# Patient Record
Sex: MALE | Race: WHITE | NOT HISPANIC OR LATINO | URBAN - METROPOLITAN AREA
[De-identification: names, ages, dates, MRNs, and addresses within clinical notes are randomized per-mention and may not be internally consistent; named-entity substitution may affect disease eponyms.]

---

## 2022-11-29 PROBLEM — Z00.00 ENCOUNTER FOR PREVENTIVE HEALTH EXAMINATION: Status: ACTIVE | Noted: 2022-11-29

## 2024-07-16 ENCOUNTER — INPATIENT (INPATIENT)
Facility: HOSPITAL | Age: 74
LOS: 2 days | Discharge: NURSING, REHAB OR LONG-TERM CARE | End: 2024-07-19
Attending: INTERNAL MEDICINE | Admitting: INTERNAL MEDICINE
Payer: MEDICARE

## 2024-07-16 ENCOUNTER — APPOINTMENT (OUTPATIENT)
Dept: ORTHOPEDIC SURGERY | Facility: CLINIC | Age: 74
End: 2024-07-16

## 2024-07-16 VITALS
WEIGHT: 164.91 LBS | DIASTOLIC BLOOD PRESSURE: 68 MMHG | HEART RATE: 76 BPM | TEMPERATURE: 98 F | RESPIRATION RATE: 16 BRPM | SYSTOLIC BLOOD PRESSURE: 102 MMHG | HEIGHT: 72 IN | OXYGEN SATURATION: 100 %

## 2024-07-16 DIAGNOSIS — Z87.898 PERSONAL HISTORY OF OTHER SPECIFIED CONDITIONS: ICD-10-CM

## 2024-07-16 DIAGNOSIS — N40.0 BENIGN PROSTATIC HYPERPLASIA WITHOUT LOWER URINARY TRACT SYMPTOMS: Chronic | ICD-10-CM

## 2024-07-16 DIAGNOSIS — Z86.59 PERSONAL HISTORY OF OTHER MENTAL AND BEHAVIORAL DISORDERS: ICD-10-CM

## 2024-07-16 DIAGNOSIS — D72.829 ELEVATED WHITE BLOOD CELL COUNT, UNSPECIFIED: ICD-10-CM

## 2024-07-16 DIAGNOSIS — R53.81 OTHER MALAISE: ICD-10-CM

## 2024-07-16 DIAGNOSIS — S72.009A FRACTURE OF UNSPECIFIED PART OF NECK OF UNSPECIFIED FEMUR, INITIAL ENCOUNTER FOR CLOSED FRACTURE: ICD-10-CM

## 2024-07-16 DIAGNOSIS — I10 ESSENTIAL (PRIMARY) HYPERTENSION: ICD-10-CM

## 2024-07-16 DIAGNOSIS — F03.90 UNSPECIFIED DEMENTIA, UNSPECIFIED SEVERITY, WITHOUT BEHAVIORAL DISTURBANCE, PSYCHOTIC DISTURBANCE, MOOD DISTURBANCE, AND ANXIETY: ICD-10-CM

## 2024-07-16 DIAGNOSIS — Z96.642 PRESENCE OF LEFT ARTIFICIAL HIP JOINT: Chronic | ICD-10-CM

## 2024-07-16 DIAGNOSIS — E11.9 TYPE 2 DIABETES MELLITUS W/OUT COMPLICATIONS: ICD-10-CM

## 2024-07-16 DIAGNOSIS — E11.9 TYPE 2 DIABETES MELLITUS WITHOUT COMPLICATIONS: ICD-10-CM

## 2024-07-16 DIAGNOSIS — F41.9 ANXIETY DISORDER, UNSPECIFIED: ICD-10-CM

## 2024-07-16 DIAGNOSIS — E78.5 HYPERLIPIDEMIA, UNSPECIFIED: ICD-10-CM

## 2024-07-16 DIAGNOSIS — Z86.39 PERSONAL HISTORY OF OTHER ENDOCRINE, NUTRITIONAL AND METABOLIC DISEASE: ICD-10-CM

## 2024-07-16 DIAGNOSIS — N40.0 BENIGN PROSTATIC HYPERPLASIA WITHOUT LOWER URINARY TRACT SYMPTOMS: ICD-10-CM

## 2024-07-16 DIAGNOSIS — F32.9 MAJOR DEPRESSIVE DISORDER, SINGLE EPISODE, UNSPECIFIED: ICD-10-CM

## 2024-07-16 DIAGNOSIS — D64.9 ANEMIA, UNSPECIFIED: ICD-10-CM

## 2024-07-16 LAB
ALBUMIN SERPL ELPH-MCNC: 2.8 G/DL — LOW (ref 3.3–5)
ALP SERPL-CCNC: 153 U/L — HIGH (ref 40–120)
ALT FLD-CCNC: 32 U/L — SIGNIFICANT CHANGE UP (ref 12–78)
ANION GAP SERPL CALC-SCNC: 5 MMOL/L — SIGNIFICANT CHANGE UP (ref 5–17)
AST SERPL-CCNC: 15 U/L — SIGNIFICANT CHANGE UP (ref 15–37)
BASOPHILS # BLD AUTO: 0.04 K/UL — SIGNIFICANT CHANGE UP (ref 0–0.2)
BASOPHILS NFR BLD AUTO: 0.3 % — SIGNIFICANT CHANGE UP (ref 0–2)
BILIRUB SERPL-MCNC: 0.7 MG/DL — SIGNIFICANT CHANGE UP (ref 0.2–1.2)
BUN SERPL-MCNC: 26 MG/DL — HIGH (ref 7–23)
CALCIUM SERPL-MCNC: 11.4 MG/DL — HIGH (ref 8.5–10.1)
CHLORIDE SERPL-SCNC: 102 MMOL/L — SIGNIFICANT CHANGE UP (ref 96–108)
CO2 SERPL-SCNC: 31 MMOL/L — SIGNIFICANT CHANGE UP (ref 22–31)
CREAT SERPL-MCNC: 0.85 MG/DL — SIGNIFICANT CHANGE UP (ref 0.5–1.3)
EGFR: 91 ML/MIN/1.73M2 — SIGNIFICANT CHANGE UP
EOSINOPHIL # BLD AUTO: 0.13 K/UL — SIGNIFICANT CHANGE UP (ref 0–0.5)
EOSINOPHIL NFR BLD AUTO: 1.1 % — SIGNIFICANT CHANGE UP (ref 0–6)
GLUCOSE BLDC GLUCOMTR-MCNC: 161 MG/DL — HIGH (ref 70–99)
GLUCOSE SERPL-MCNC: 103 MG/DL — HIGH (ref 70–99)
HCT VFR BLD CALC: 29.5 % — LOW (ref 39–50)
HGB BLD-MCNC: 9.5 G/DL — LOW (ref 13–17)
IMM GRANULOCYTES NFR BLD AUTO: 0.9 % — SIGNIFICANT CHANGE UP (ref 0–0.9)
LYMPHOCYTES # BLD AUTO: 1.64 K/UL — SIGNIFICANT CHANGE UP (ref 1–3.3)
LYMPHOCYTES # BLD AUTO: 13.4 % — SIGNIFICANT CHANGE UP (ref 13–44)
MCHC RBC-ENTMCNC: 28.3 PG — SIGNIFICANT CHANGE UP (ref 27–34)
MCHC RBC-ENTMCNC: 32.2 G/DL — SIGNIFICANT CHANGE UP (ref 32–36)
MCV RBC AUTO: 87.8 FL — SIGNIFICANT CHANGE UP (ref 80–100)
MONOCYTES # BLD AUTO: 0.63 K/UL — SIGNIFICANT CHANGE UP (ref 0–0.9)
MONOCYTES NFR BLD AUTO: 5.1 % — SIGNIFICANT CHANGE UP (ref 2–14)
NEUTROPHILS # BLD AUTO: 9.73 K/UL — HIGH (ref 1.8–7.4)
NEUTROPHILS NFR BLD AUTO: 79.2 % — HIGH (ref 43–77)
NRBC # BLD: 0 /100 WBCS — SIGNIFICANT CHANGE UP (ref 0–0)
PLATELET # BLD AUTO: 342 K/UL — SIGNIFICANT CHANGE UP (ref 150–400)
POTASSIUM SERPL-MCNC: 3.9 MMOL/L — SIGNIFICANT CHANGE UP (ref 3.5–5.3)
POTASSIUM SERPL-SCNC: 3.9 MMOL/L — SIGNIFICANT CHANGE UP (ref 3.5–5.3)
PROT SERPL-MCNC: 6.3 GM/DL — SIGNIFICANT CHANGE UP (ref 6–8.3)
RBC # BLD: 3.36 M/UL — LOW (ref 4.2–5.8)
RBC # FLD: 14.6 % — HIGH (ref 10.3–14.5)
SODIUM SERPL-SCNC: 138 MMOL/L — SIGNIFICANT CHANGE UP (ref 135–145)
WBC # BLD: 12.28 K/UL — HIGH (ref 3.8–10.5)
WBC # FLD AUTO: 12.28 K/UL — HIGH (ref 3.8–10.5)

## 2024-07-16 PROCEDURE — 99204 OFFICE O/P NEW MOD 45 MIN: CPT

## 2024-07-16 PROCEDURE — 73502 X-RAY EXAM HIP UNI 2-3 VIEWS: CPT | Mod: 26,LT

## 2024-07-16 PROCEDURE — 99222 1ST HOSP IP/OBS MODERATE 55: CPT

## 2024-07-16 PROCEDURE — 99285 EMERGENCY DEPT VISIT HI MDM: CPT

## 2024-07-16 PROCEDURE — 93010 ELECTROCARDIOGRAM REPORT: CPT

## 2024-07-16 PROCEDURE — 73503 X-RAY EXAM HIP UNI 4/> VIEWS: CPT | Mod: LT

## 2024-07-16 PROCEDURE — 73552 X-RAY EXAM OF FEMUR 2/>: CPT | Mod: 26,LT

## 2024-07-16 RX ORDER — SITAGLIPTIN AND METFORMIN HYDROCHLORIDE 50; 1000 MG/1; MG/1
50-1000 TABLET, FILM COATED ORAL
Refills: 0 | Status: ACTIVE | COMMUNITY

## 2024-07-16 RX ORDER — EMPAGLIFLOZIN 10 MG/1
10 TABLET, FILM COATED ORAL
Refills: 0 | Status: ACTIVE | COMMUNITY

## 2024-07-16 RX ORDER — INSULIN LISPRO 100 [IU]/ML
INJECTION, SOLUTION SUBCUTANEOUS
Refills: 0 | Status: DISCONTINUED | OUTPATIENT
Start: 2024-07-16 | End: 2024-07-19

## 2024-07-16 RX ORDER — ACETAMINOPHEN 325 MG
650 TABLET ORAL EVERY 6 HOURS
Refills: 0 | Status: DISCONTINUED | OUTPATIENT
Start: 2024-07-16 | End: 2024-07-19

## 2024-07-16 RX ORDER — FINASTERIDE 5 MG/1
5 TABLET, FILM COATED ORAL
Refills: 0 | Status: ACTIVE | COMMUNITY

## 2024-07-16 RX ORDER — GLIPIZIDE 5 MG
1 TABLET ORAL
Refills: 0 | DISCHARGE

## 2024-07-16 RX ORDER — GLUCAGON HYDROCHLORIDE 1 MG/ML
1 INJECTION, POWDER, FOR SOLUTION INTRAMUSCULAR; INTRAVENOUS; SUBCUTANEOUS ONCE
Refills: 0 | Status: DISCONTINUED | OUTPATIENT
Start: 2024-07-16 | End: 2024-07-19

## 2024-07-16 RX ORDER — GLIPIZIDE 10 MG/1
10 TABLET ORAL
Refills: 0 | Status: ACTIVE | COMMUNITY

## 2024-07-16 RX ORDER — TAMSULOSIN HYDROCHLORIDE 0.4 MG/1
0.8 CAPSULE ORAL AT BEDTIME
Refills: 0 | Status: DISCONTINUED | OUTPATIENT
Start: 2024-07-16 | End: 2024-07-19

## 2024-07-16 RX ORDER — DEXTROSE 30 % IN WATER 30 %
12.5 VIAL (ML) INTRAVENOUS ONCE
Refills: 0 | Status: DISCONTINUED | OUTPATIENT
Start: 2024-07-16 | End: 2024-07-19

## 2024-07-16 RX ORDER — DEXTROSE 30 % IN WATER 30 %
15 VIAL (ML) INTRAVENOUS ONCE
Refills: 0 | Status: DISCONTINUED | OUTPATIENT
Start: 2024-07-16 | End: 2024-07-19

## 2024-07-16 RX ORDER — DEXTROSE 30 % IN WATER 30 %
25 VIAL (ML) INTRAVENOUS ONCE
Refills: 0 | Status: DISCONTINUED | OUTPATIENT
Start: 2024-07-16 | End: 2024-07-19

## 2024-07-16 RX ORDER — ASPIRIN 325 MG/1
81 TABLET, FILM COATED ORAL
Refills: 0 | Status: DISCONTINUED | OUTPATIENT
Start: 2024-07-16 | End: 2024-07-19

## 2024-07-16 RX ORDER — EMPAGLIFLOZIN 25 MG/1
1 TABLET, FILM COATED ORAL
Refills: 0 | DISCHARGE

## 2024-07-16 RX ORDER — CLONAZEPAM 2 MG/1
0.5 TABLET ORAL
Refills: 0 | Status: DISCONTINUED | OUTPATIENT
Start: 2024-07-16 | End: 2024-07-19

## 2024-07-16 RX ORDER — ATORVASTATIN CALCIUM 20 MG/1
1 TABLET, FILM COATED ORAL
Refills: 0 | DISCHARGE

## 2024-07-16 RX ORDER — ATORVASTATIN CALCIUM 40 MG/1
40 TABLET, FILM COATED ORAL
Refills: 0 | Status: ACTIVE | COMMUNITY

## 2024-07-16 RX ORDER — CLONAZEPAM 1 MG/1
1 TABLET ORAL
Refills: 0 | Status: ACTIVE | COMMUNITY

## 2024-07-16 RX ORDER — LOSARTAN POTASSIUM 100 MG/1
100 TABLET, FILM COATED ORAL
Refills: 0 | Status: ACTIVE | COMMUNITY

## 2024-07-16 RX ORDER — CLONAZEPAM 2 MG/1
1 TABLET ORAL
Refills: 0 | DISCHARGE

## 2024-07-16 RX ORDER — DONEPEZIL HYDROCHLORIDE 10 MG/1
10 TABLET, FILM COATED ORAL DAILY
Refills: 0 | Status: DISCONTINUED | OUTPATIENT
Start: 2024-07-17 | End: 2024-07-19

## 2024-07-16 RX ORDER — LOSARTAN POTASSIUM 100 MG/1
100 TABLET, FILM COATED ORAL DAILY
Refills: 0 | Status: DISCONTINUED | OUTPATIENT
Start: 2024-07-16 | End: 2024-07-19

## 2024-07-16 RX ORDER — DEXTROSE MONOHYDRATE AND SODIUM CHLORIDE 5; .3 G/100ML; G/100ML
1000 INJECTION, SOLUTION INTRAVENOUS
Refills: 0 | Status: DISCONTINUED | OUTPATIENT
Start: 2024-07-16 | End: 2024-07-19

## 2024-07-16 RX ORDER — ONDANSETRON HYDROCHLORIDE 2 MG/ML
4 INJECTION INTRAMUSCULAR; INTRAVENOUS EVERY 8 HOURS
Refills: 0 | Status: DISCONTINUED | OUTPATIENT
Start: 2024-07-16 | End: 2024-07-19

## 2024-07-16 RX ORDER — PAROXETINE HYDROCHLORIDE 37.5 MG/1
2 TABLET, FILM COATED, EXTENDED RELEASE ORAL
Refills: 0 | DISCHARGE

## 2024-07-16 RX ORDER — PAROXETINE HYDROCHLORIDE 37.5 MG/1
20 TABLET, FILM COATED, EXTENDED RELEASE ORAL
Refills: 0 | Status: DISCONTINUED | OUTPATIENT
Start: 2024-07-16 | End: 2024-07-19

## 2024-07-16 RX ORDER — DONEPEZIL HYDROCHLORIDE 10 MG/1
1 TABLET, FILM COATED ORAL
Refills: 0 | DISCHARGE

## 2024-07-16 RX ORDER — QUETIAPINE FUMARATE 25 MG/1
25 TABLET ORAL
Refills: 0 | Status: ACTIVE | COMMUNITY

## 2024-07-16 RX ORDER — MAGNESIUM, ALUMINUM HYDROXIDE 400-400
30 TABLET,CHEWABLE ORAL EVERY 4 HOURS
Refills: 0 | Status: DISCONTINUED | OUTPATIENT
Start: 2024-07-16 | End: 2024-07-19

## 2024-07-16 RX ORDER — PAROXETINE HYDROCHLORIDE 10 MG/1
10 TABLET, FILM COATED ORAL
Refills: 0 | Status: ACTIVE | COMMUNITY

## 2024-07-16 RX ORDER — DONEPEZIL HYDROCHLORIDE 10 MG/1
10 TABLET ORAL
Refills: 0 | Status: ACTIVE | COMMUNITY

## 2024-07-16 RX ORDER — ALFUZOSIN HYDROCHLORIDE 10 MG/1
1 TABLET, EXTENDED RELEASE ORAL
Refills: 0 | DISCHARGE

## 2024-07-16 RX ORDER — SITAGLIPTIN AND METFORMIN HYDROCHLORIDE 50; 500 MG/1; MG/1
2 TABLET, FILM COATED ORAL
Refills: 0 | DISCHARGE

## 2024-07-16 RX ORDER — ATORVASTATIN CALCIUM 20 MG/1
40 TABLET, FILM COATED ORAL AT BEDTIME
Refills: 0 | Status: DISCONTINUED | OUTPATIENT
Start: 2024-07-16 | End: 2024-07-19

## 2024-07-16 RX ADMIN — Medication 50 MILLIGRAM(S): at 21:38

## 2024-07-16 RX ADMIN — ASPIRIN 81 MILLIGRAM(S): 325 TABLET, FILM COATED ORAL at 19:29

## 2024-07-16 RX ADMIN — TAMSULOSIN HYDROCHLORIDE 0.8 MILLIGRAM(S): 0.4 CAPSULE ORAL at 21:38

## 2024-07-16 RX ADMIN — ATORVASTATIN CALCIUM 40 MILLIGRAM(S): 20 TABLET, FILM COATED ORAL at 21:38

## 2024-07-16 RX ADMIN — INSULIN LISPRO 1: 100 INJECTION, SOLUTION SUBCUTANEOUS at 21:38

## 2024-07-16 NOTE — ED ADULT NURSE NOTE - OBJECTIVE STATEMENT
Pt alert and awake, ambulating with assistive device. Wife at bedside reports they were in [place] when pt fell on marble floor after showering on 7/1/24. Wife reports pt broke L hip and had it replaced in [place]. Wife reports they came back on flight yesterday and went to see their doctor where Delaney catheter was removed and was recommended to come to hospital for rehab admission. Wife concerned as they live in [facility] with bedroom and full bathroom on 2nd floor. Pt denies SOB, fever/chills, N/V/D, headache/dizziness, abdominal pain, chest pain, or dysuria.

## 2024-07-16 NOTE — CONSULT NOTE ADULT - SUBJECTIVE AND OBJECTIVE BOX
73M s/p 2 weeks Left hip Hemiarthroplasty in [place] while on vacation presenting for rehab placement 2/2 inability to navigate safely at home. Patient saw Dr. West in the office today after being home from [place] and was sent to the ED. Patient was given blood thinners and a pedraza to travel from [place] to [place].   Patient has no other complaints at this time  Denies HA/N/V/D/CP/SOB    Vital Signs Last 24 Hrs  T(C): 36.8 (16 Jul 2024 17:22), Max: 36.8 (16 Jul 2024 17:22)  T(F): 98.2 (16 Jul 2024 17:22), Max: 98.2 (16 Jul 2024 17:22)  HR: 80 (16 Jul 2024 17:22) (76 - 80)  BP: 123/65 (16 Jul 2024 17:22) (102/68 - 123/65)  BP(mean): --  RR: 17 (16 Jul 2024 17:22) (16 - 17)  SpO2: 99% (16 Jul 2024 17:22) (99% - 100%)    Parameters below as of 16 Jul 2024 17:22  Patient On (Oxygen Delivery Method): room air                          9.5    12.28 )-----------( 342      ( 16 Jul 2024 16:10 )             29.5     07-16    138  |  102  |  26<H>  ----------------------------<  103<H>  3.9   |  31  |  0.85    Ca    11.4<H>      16 Jul 2024 16:10    TPro  6.3  /  Alb  2.8<L>  /  TBili  0.7  /  DBili  x   /  AST  15  /  ALT  32  /  AlkPhos  153<H>  07-16          Urinalysis Basic - ( 16 Jul 2024 16:10 )    Color: x / Appearance: x / SG: x / pH: x  Gluc: 103 mg/dL / Ketone: x  / Bili: x / Urobili: x   Blood: x / Protein: x / Nitrite: x   Leuk Esterase: x / RBC: x / WBC x   Sq Epi: x / Non Sq Epi: x / Bacteria: x            CAPILLARY BLOOD GLUCOSE                Physical Exam  Gen: NAD  LLE: Incision healing well. Staples removed in office. Full ROM to hip/knee/ankle. +ehl/fhl/ta/gs function, SILT L3-S1, no calf ttp, dp/pt pulse intact, compartments soft  Secondary survey: benign, nv intact, able to SLR contralateral leg, negative log roll contralateral leg, no bony ttp elsewhere

## 2024-07-16 NOTE — H&P ADULT - NSICDXPASTSURGICALHX_GEN_ALL_CORE_FT
PAST SURGICAL HISTORY:  BPH (benign prostatic hyperplasia)     History of left hip hemiarthroplasty

## 2024-07-16 NOTE — ED ADULT NURSE NOTE - CHIEF COMPLAINT QUOTE
Sent by [facility] for pre rehab screening. patient had a fractured left hip  with surgery after slipping and fell in bathroom in [place] on 7/1/24.  hx htn, dm, depression ,dementia, BPH, anxiety.

## 2024-07-16 NOTE — CONSULT NOTE ADULT - ASSESSMENT
73M 2 weeks post op L hemiarthroplasty presenting for [facility] placement  PT consult   WBAT  DVT ppx: Aspirin 81 BID  Pain control  Medical management as per primary team  Discussed with Dr. West

## 2024-07-16 NOTE — H&P ADULT - ASSESSMENT
Panchito Win is a 73 year old male with PMHx of HTN, HLD, NIDDM2, BPH, anxiety, depression, and dementia and recent PSHx L. hemiarthroplasty (on 7/3/24) who presented to the ED on 7/16/24 for complaints of unsteady gait and admitted for physical deconditioning secondary to recent L. hip fracture.    Physical deconditioning secondary to recent L. hip fracture  Sustained mechanical fall while walking out of the shower on 7/1/24, found to have L. hip fracture  Underwent L. hip hemiarthroplasty on 7/3/24, participated with PT inpatient, still with unsteady gait   Baseline functional status is ambulates unassisted  L. hip and L. femur x-ray with L. hip replacement and moderate L. knee degeneration  DVT prophylaxis with ASA 81 mg BID started  PT/OT WBAT consulted  Orthopedic surgery recommendations appreciated    Leukocytosis, likely reactive  Afebrile, WBC 12.28K on admission  No signs of infection  Monitor WBC trend    Normocytic anemia  Hgb 9.5 on admission, unknown baseline hgb  No signs of active bleeding  F/u anemia panel, monitor hgb      Chronic medical conditions:  HTN: PTA losartan / HCTZ 100 / 12.5 mg,   HLD: PTA atorvastatin 40 mg  NIDDM2: POC qac and qhs, PTA glipizide ER 10 mg, Janumet XR 50 / 1000 mg BID, and jardiance 10 mg held, low dose SSI qac started, blood glucose goal < 180, f/u A1c  BPH: PTA alfuzosin 10 mg reordered as tamsulosin 0.8 mg given alfuzosin not on formulary, finasteride 5 mg  Anxiety: PTA paroxetine 20 mg BID, clonazepam 0.5 mg BID  Depression: PTA quetiapine 50 mg qhs  Dementia: PTA donepezil 10 mg    Medication reconciliation completed using med list provided by wife at bedside.    Plan of care discussed with wife at bedside. Panchito Win is a 73 year old male with PMHx of HTN, HLD, NIDDM2, BPH, anxiety, depression, and dementia and recent PSHx L. hemiarthroplasty (on 7/3/24) who presented to the ED on 7/16/24 for complaints of unsteady gait and admitted for physical deconditioning secondary to recent L. hip fracture.    Physical deconditioning secondary to recent L. hip fracture  Sustained mechanical fall while walking out of the shower on 7/1/24, found to have L. hip fracture  Underwent L. hip hemiarthroplasty on 7/3/24, participated with PT inpatient, still with unsteady gait   Baseline functional status is ambulates unassisted  L. hip and L. femur x-ray with L. hip replacement and moderate L. knee degeneration  DVT prophylaxis with ASA 81 mg BID started  PT/OT WBAT consulted  Orthopedic surgery recommendations appreciated    Leukocytosis, likely reactive  Afebrile, WBC 12.28K on admission  No signs of infection  Monitor WBC trend    Normocytic anemia  Hgb 9.5 on admission, unknown baseline hgb  No signs of active bleeding  F/u anemia panel, monitor hgb      Chronic medical conditions:  HTN: PTA losartan / HCTZ 100 / 12.5 mg  HLD: PTA atorvastatin 40 mg  NIDDM2: POC qac and qhs, PTA glipizide ER 10 mg, Janumet XR 50 / 1000 mg BID, and jardiance 10 mg held, low dose SSI qac started, blood glucose goal < 180, f/u A1c  BPH: PTA alfuzosin 10 mg reordered as tamsulosin 0.8 mg given alfuzosin not on formulary, finasteride 5 mg  Anxiety: PTA paroxetine 20 mg BID, clonazepam 0.5 mg BID  Depression: PTA quetiapine 50 mg qhs  Dementia: PTA donepezil 10 mg    Medication reconciliation completed using med list provided by wife at bedside.    Plan of care discussed with wife at bedside.

## 2024-07-16 NOTE — ED ADULT TRIAGE NOTE - CHIEF COMPLAINT QUOTE
Sent by [facility] for pre rehab screening. patient had a fractured left hip  with surgery after slipping and fell in bathroom in [place] on 7/1/24.  hx htn, dm, depression ,dementia, BPH, anxiety.

## 2024-07-16 NOTE — H&P ADULT - NSICDXPASTMEDICALHX_GEN_ALL_CORE_FT
PAST MEDICAL HISTORY:  Anxiety and depression     Dementia     HLD (hyperlipidemia)     HTN (hypertension)     Non-insulin dependent type 2 diabetes mellitus      PAST MEDICAL HISTORY:  Anxiety and depression     BPH (benign prostatic hyperplasia)     Dementia     HLD (hyperlipidemia)     HTN (hypertension)     Non-insulin dependent type 2 diabetes mellitus

## 2024-07-16 NOTE — ED ADULT NURSE NOTE - NSFALLHARMRISKINTERV_ED_ALL_ED

## 2024-07-16 NOTE — H&P ADULT - NSHPPHYSICALEXAM_GEN_ALL_CORE
T(C): 36.7 (07-16-24 @ 21:23), Max: 36.8 (07-16-24 @ 17:22)  HR: 76 (07-16-24 @ 21:23) (76 - 83)  BP: 123/68 (07-16-24 @ 21:23) (102/68 - 123/68)  RR: 16 (07-16-24 @ 21:23) (16 - 17)  SpO2: 100% (07-16-24 @ 21:23) (99% - 100%)    CONSTITUTIONAL: Well groomed, no apparent distress  EYES: PERRLA and symmetric, EOMI  ENMT: Oral mucosa with moist membranes  RESP: No respiratory distress, no use of accessory muscles; CTA b/l  CV: RRR  GI: Soft, NT, ND  MSK: L. hip incision appears C/D/I, no surrounding erythema or ecchymosis appreciated

## 2024-07-16 NOTE — H&P ADULT - TIME BILLING
coordination of care with ER physician and ER RN, obtaining history, performing a physical examination, reviewing and interpreting labs and imaging, ordering further studies and tests, explaining the diagnosis and treatment plan to patient and wife at bedside, completing medication reconciliation, and documentation as above.

## 2024-07-16 NOTE — ED PROVIDER NOTE - OBJECTIVE STATEMENT
72 y/o M hx of BPH, DM, HTN, dementia sent in by dr. justin (orthopedic) for rehab. patient w/ fractured hip that was repaired in [place] 2 weeks ago. wife at bedside who states he has been unsteady. denies chest pain/sob. denies abdominal pain. denies nausea/vomiting. denies fever/chills.

## 2024-07-16 NOTE — CHART NOTE - NSCHARTNOTEFT_GEN_A_CORE
Confidential Drug Utilization Report  Search Terms: Panchito Win, 1950Search Date: 07/16/2024 22:44:15 PM  Searching on behalf of: Myself  The Drug Utilization Report below displays all of the controlled substance prescriptions, if any, that your patient has filled in the last twelve months. The information displayed on this report is compiled from pharmacy submissions to the Department, and accurately reflects the information as submitted by the pharmacies.    This report was requested by: Lindsay Rubio | Reference #: [id number]    Practitioner Count: 1  Pharmacy Count: 1  Current Opioid Prescriptions: 0  Current Benzodiazepine Prescriptions: 1  Current Stimulant Prescriptions: 0      Patient Demographic Information (PDI)       PDI	First Name	Last Name	Birth Date	Gender	Street Address	[facility]	Zip Code  [place]	Panchito Win	1950	Male	[address] [id number]    Prescription Information      PDI Filter:    PDI	My Rx	Current Rx	Drug Type	Rx Written	Rx Dispensed	Drug	Quantity	Days Supply	Prescriber Name	Prescriber NAVARRO #	Payment Method	Dispenser  A	N	Y	B	06/28/2024	06/28/2024	clonazepam 1 mg tablet	90	30	Brian Duncan	[id number]	Medicare	[id number]  A	N	N	B	06/05/2024	06/05/2024	clonazepam 1 mg tablet	30	7	Brian Duncan	[id number]	[facility] #[id number]    * - Details of Drug Type : O = Opioid, B = Benzodiazepine, S = Stimulant    * - Drugs marked with an asterisk are compound drugs. If the compound drug is made up of more than one controlled substance, then each controlled substance will be a separate row in the table.

## 2024-07-16 NOTE — H&P ADULT - HISTORY OF PRESENT ILLNESS
Panchito Win is a 73 year old male with PMHx of HTN, HLD, NIDDM2, BPH, anxiety, depression, and dementia and recent PSHx L. hemiarthroplasty (on 7/3/24) who presented to the ED on 7/16/24 for complaints of unsteady gait.    History obtained from wife at bedside as patient is unable to provide history due to dementia. As per wife, they flew to [facility] ED on 6/30/24 and arrived on 7/1/24. When they got to the hotel, patient went to shower and fell when he walked out of the shower. Landed on his left hip. Went to the ER. Found to have left hip fracture and underwent surgery on 7/3/24. Participated with PT while hospitalized. Hospital course prolonged due to inability to ambulate. Decision was made to keep Delaney in patient for [place] prevent him from getting up to use the bathroom. Flew home last night. Went to see Dr. Prince West this morning who advised going to the ER for [facility] placement. Staples and Delaney were removed by Dr. West. Baseline functional status is ambulates unassisted and dependent with all ADLs. Lives at home with wife.    In the ED, VSS. WBC 12.28K, hgb 9.5, alkphos 153. Left hip and left femur x-ray with left hip replacement and moderate left knee degeneration. Did not receive any medications. Evaluated by orthopedic surgery who recommends PT WBAT and DVT prophylaxis with ASA 81 mg BID.

## 2024-07-16 NOTE — H&P ADULT - NSHPLABSRESULTS_GEN_ALL_CORE
9.5    12.28 )-----------( 342      ( 16 Jul 2024 16:10 )             29.5     138  |  102  |  26<H>  ----------------------------<  103<H>     07-16  3.9   |  31  |  0.85    Ca    11.4<H>      16 Jul 2024 16:10    TPro  6.3  /  Alb  2.8<L>  /  TBili  0.7  /  DBili  x   /  AST  15  /  ALT  32  /  AlkPhos  153<H>  07-16    L. hip and femur x-ray 7/16/24  IMPRESSION:  Left hip replacement. Moderate left knee degeneration.

## 2024-07-16 NOTE — ED PROVIDER NOTE - CLINICAL SUMMARY MEDICAL DECISION MAKING FREE TEXT BOX
74 y/o M hx of BPH, DM, HTN, dementia sent in by dr. justin (orthopedic) for rehab. patient w/ fractured hip that was repaired in [place] 2 weeks ago. wife at bedside who states he has been unsteady. denies chest pain/sob. denies abdominal pain. denies nausea/vomiting. denies fever/chills.   patient able to ambulate but w/ bilateral canes. neurovascularly intact LLE. compartments are soft  spoke to orthopedic service who is aware of patient and they would like patient admitted to medicine service for PT/rehab placement.  patient declining pain meds at this time. 74 y/o M hx of BPH, DM, HTN, dementia sent in by dr. justin (orthopedic) for rehab. patient w/ fractured hip that was repaired in [place] 2 weeks ago. wife at bedside who states he has been unsteady. denies chest pain/sob. denies abdominal pain. denies nausea/vomiting. denies fever/chills.   patient able to ambulate but w/ bilateral canes. neurovascularly intact LLE. compartments are soft  spoke to orthopedic service who is aware of patient and they would like patient admitted to medicine service for PT/rehab placement.  patient declining pain meds at this time.    ortho consulted, admit to medicine for PT eval, likely [facility]. ortho recommendations noted.   EKG reviewed, non-ischemic.

## 2024-07-17 LAB
A1C WITH ESTIMATED AVERAGE GLUCOSE RESULT: 8.1 % — HIGH (ref 4–5.6)
ESTIMATED AVERAGE GLUCOSE: 186 MG/DL — HIGH (ref 68–114)
FERRITIN SERPL-MCNC: 612 NG/ML — HIGH (ref 30–400)
FOLATE SERPL-MCNC: 12.3 NG/ML — SIGNIFICANT CHANGE UP
GLUCOSE BLDC GLUCOMTR-MCNC: 187 MG/DL — HIGH (ref 70–99)
GLUCOSE BLDC GLUCOMTR-MCNC: 234 MG/DL — HIGH (ref 70–99)
GLUCOSE BLDC GLUCOMTR-MCNC: 261 MG/DL — HIGH (ref 70–99)
GLUCOSE BLDC GLUCOMTR-MCNC: 331 MG/DL — HIGH (ref 70–99)
HCT VFR BLD CALC: 34.5 % — LOW (ref 39–50)
HGB BLD-MCNC: 11.1 G/DL — LOW (ref 13–17)
IRON SATN MFR SERPL: 15 % — LOW (ref 16–55)
IRON SATN MFR SERPL: 43 UG/DL — LOW (ref 45–165)
MCHC RBC-ENTMCNC: 28.5 PG — SIGNIFICANT CHANGE UP (ref 27–34)
MCHC RBC-ENTMCNC: 32.2 G/DL — SIGNIFICANT CHANGE UP (ref 32–36)
MCV RBC AUTO: 88.5 FL — SIGNIFICANT CHANGE UP (ref 80–100)
NRBC # BLD: 0 /100 WBCS — SIGNIFICANT CHANGE UP (ref 0–0)
PLATELET # BLD AUTO: 384 K/UL — SIGNIFICANT CHANGE UP (ref 150–400)
RBC # BLD: 3.9 M/UL — LOW (ref 4.2–5.8)
RBC # FLD: 14.4 % — SIGNIFICANT CHANGE UP (ref 10.3–14.5)
TIBC SERPL-MCNC: 295 UG/DL — SIGNIFICANT CHANGE UP (ref 220–430)
UIBC SERPL-MCNC: 251 UG/DL — SIGNIFICANT CHANGE UP (ref 110–370)
VIT B12 SERPL-MCNC: 522 PG/ML — SIGNIFICANT CHANGE UP (ref 232–1245)
WBC # BLD: 12.92 K/UL — HIGH (ref 3.8–10.5)
WBC # FLD AUTO: 12.92 K/UL — HIGH (ref 3.8–10.5)

## 2024-07-17 PROCEDURE — 99232 SBSQ HOSP IP/OBS MODERATE 35: CPT

## 2024-07-17 RX ADMIN — INSULIN LISPRO 2: 100 INJECTION, SOLUTION SUBCUTANEOUS at 16:28

## 2024-07-17 RX ADMIN — LOSARTAN POTASSIUM 100 MILLIGRAM(S): 100 TABLET, FILM COATED ORAL at 06:00

## 2024-07-17 RX ADMIN — Medication 650 MILLIGRAM(S): at 11:22

## 2024-07-17 RX ADMIN — ASPIRIN 81 MILLIGRAM(S): 325 TABLET, FILM COATED ORAL at 07:12

## 2024-07-17 RX ADMIN — ASPIRIN 81 MILLIGRAM(S): 325 TABLET, FILM COATED ORAL at 17:40

## 2024-07-17 RX ADMIN — DONEPEZIL HYDROCHLORIDE 10 MILLIGRAM(S): 10 TABLET, FILM COATED ORAL at 14:11

## 2024-07-17 RX ADMIN — PAROXETINE HYDROCHLORIDE 20 MILLIGRAM(S): 37.5 TABLET, FILM COATED, EXTENDED RELEASE ORAL at 07:13

## 2024-07-17 RX ADMIN — TAMSULOSIN HYDROCHLORIDE 0.8 MILLIGRAM(S): 0.4 CAPSULE ORAL at 22:31

## 2024-07-17 RX ADMIN — ATORVASTATIN CALCIUM 40 MILLIGRAM(S): 20 TABLET, FILM COATED ORAL at 22:31

## 2024-07-17 RX ADMIN — CLONAZEPAM 0.5 MILLIGRAM(S): 2 TABLET ORAL at 17:40

## 2024-07-17 RX ADMIN — CLONAZEPAM 0.5 MILLIGRAM(S): 2 TABLET ORAL at 07:13

## 2024-07-17 RX ADMIN — INSULIN LISPRO 3: 100 INJECTION, SOLUTION SUBCUTANEOUS at 12:08

## 2024-07-17 RX ADMIN — Medication 50 MILLIGRAM(S): at 22:32

## 2024-07-17 RX ADMIN — Medication 5 MILLIGRAM(S): at 11:22

## 2024-07-17 RX ADMIN — INSULIN LISPRO 1: 100 INJECTION, SOLUTION SUBCUTANEOUS at 08:34

## 2024-07-17 RX ADMIN — PAROXETINE HYDROCHLORIDE 20 MILLIGRAM(S): 37.5 TABLET, FILM COATED, EXTENDED RELEASE ORAL at 18:42

## 2024-07-17 NOTE — PHYSICAL THERAPY INITIAL EVALUATION ADULT - RANGE OF MOTION EXAMINATION, REHAB EVAL
LLE hip within limits of THR procaution/bilateral upper extremity ROM was WFL (within functional limits)/Right LE ROM was WFL (within functional limits)

## 2024-07-17 NOTE — OCCUPATIONAL THERAPY INITIAL EVALUATION ADULT - ADDITIONAL COMMENTS
As per Pts wife Carolyn, Pt lives with spouse in a private house with 4 steps with bilateral handrails to enter. Once inside, the pt has a flight of steps with a rail to reach the main floor where the bedroom and bathroom is. The pt ambulates with no device and does not own any device for ambulation.

## 2024-07-17 NOTE — PHYSICAL THERAPY INITIAL EVALUATION ADULT - ORIENTATION, REHAB EVAL
with periods of confusion, disorientation, short attention span./oriented to person, place, time and situation

## 2024-07-17 NOTE — OCCUPATIONAL THERAPY INITIAL EVALUATION ADULT - PERTINENT HX OF CURRENT PROBLEM, REHAB EVAL
As per H&P; Panchito Win is a 73 year old male with PMHx of HTN, HLD, NIDDM2, BPH, anxiety, depression, and dementia and recent PSHx L. hemiarthroplasty (on 7/3/24) who presented to the ED on 7/16/24 for complaints of unsteady gait.    History obtained from wife at bedside as patient is unable to provide history due to dementia. As per wife, they flew to [facility] on 6/30/24 and arrived on 7/1/24. When they got to the hot, patient went to shower and fell when he walked out of the shower. Landed on his left hip. Went to the ER. Found to have left hip fracture and underwent surgery on 7/3/24. Participated with PT while hospitalized. Hospital course prolonged due to inability to ambulate. Decision was made to keep Delaney in patient for [place] to prevent him from getting up to use the bathroom. Flew home last night. Went to see Dr. Prince West this morning who advised going to the ER for [facility] placement. Staples and Delaney were removed by Dr. West. Baseline functional status is ambulates unassisted and dependent with all ADLs. Lives at home with wife.    In the ED, VSS. WBC 12.28K, hgb 9.5, alkphos 153. Left hip and left femur x-ray with left hip replacement and moderate left knee degeneration. Did not receive any medications. Evaluated by orthopedic surgery who recommends PT WBAT and DVT prophylaxis with ASA 81 mg BID.

## 2024-07-17 NOTE — PROGRESS NOTE ADULT - ASSESSMENT
Panchito Win is a 73 year old male with PMHx of HTN, HLD, NIDDM2, BPH, anxiety, depression, and dementia and recent PSHx L. hemiarthroplasty (on 7/3/24) who presented to the ED on 7/16/24 for complaints of unsteady gait and admitted for physical deconditioning secondary to recent L. hip fracture.    Physical deconditioning secondary to recent L. hip fracture  Sustained mechanical fall while walking out of the shower on 7/1/24, found to have L. hip fracture  Underwent L. hip hemiarthroplasty on 7/3/24, participated with PT inpatient, still with unsteady gait   Baseline functional status is ambulates unassisted  L. hip and L. femur x-ray with L. hip replacement and moderate L. knee degeneration  DVT prophylaxis with ASA 81 mg BID started  PT/OT WBAT consulted  Orthopedic surgery recommendations appreciated    Leukocytosis, likely reactive  Afebrile, WBC 12.28K on admission  No signs of infection  Monitor WBC trend    Normocytic anemia  Hgb 9.5 on admission, unknown baseline hgb  No signs of active bleeding  F/u anemia panel, monitor hgb      Chronic medical conditions:  HTN: PTA losartan / HCTZ 100 / 12.5 mg  HLD: PTA atorvastatin 40 mg  NIDDM2: POC qac and qhs, PTA glipizide ER 10 mg, Janumet XR 50 / 1000 mg BID, and jardiance 10 mg held, low dose SSI qac started, blood glucose goal < 180, f/u A1c  BPH: PTA alfuzosin 10 mg reordered as tamsulosin 0.8 mg given alfuzosin not on formulary, finasteride 5 mg  Anxiety: PTA paroxetine 20 mg BID, clonazepam 0.5 mg BID  Depression: PTA quetiapine 50 mg qhs  Dementia: PTA donepezil 10 mg

## 2024-07-17 NOTE — PHYSICAL THERAPY INITIAL EVALUATION ADULT - STRENGTHENING, PT EVAL
Improve strength in the LLE to 5/5 especially and be able to perform functional tasks-bed mobility, sitting, standing, transfers and ambulate in a safe manner with or without  assistive device and prevent falls.

## 2024-07-17 NOTE — OCCUPATIONAL THERAPY INITIAL EVALUATION ADULT - GENERAL OBSERVATIONS, REHAB EVAL
Pt was encountered supine in bed with pts wife Carolyn present; NAD, pedraza +, s/p L Hip hemiarthroplasty (7/3/24), L hip dressing clean, dry and intact, posterior hip precautions, alert, cooperative, followed 1 step commands 50% of the time; unable to follow multi step commands; pt c/o pain in L hip which impacts pts ability to perform functional tasks/transfers and mobility.

## 2024-07-17 NOTE — OCCUPATIONAL THERAPY INITIAL EVALUATION ADULT - PERSONAL SAFETY AND JUDGMENT, REHAB EVAL
Pt required verbal/physical cues for hand/foot/walker placement, task sequencing and safety awareness./impaired/at risk behaviors demonstrated

## 2024-07-17 NOTE — PHYSICAL THERAPY INITIAL EVALUATION ADULT - PERTINENT HX OF CURRENT PROBLEM, REHAB EVAL
AS per wife they went for a [place] , pt fell in the bathroom, dx fx L hip, s/p THR posterior aspect. WBAT status.

## 2024-07-17 NOTE — PATIENT PROFILE ADULT - FUNCTIONAL ASSESSMENT - BASIC MOBILITY 6.
3-calculated by average/Not able to assess (calculate score using [facility] averaging method)

## 2024-07-17 NOTE — PATIENT PROFILE ADULT - FALL HARM RISK - HARM RISK INTERVENTIONS

## 2024-07-17 NOTE — PHYSICAL THERAPY INITIAL EVALUATION ADULT - FUNCTIONAL LIMITATIONS, PT EVAL
Indication: Prolia  (denosumab) is a prescription medicine used to treat osteoporosis in patients who:     Are at high risk for fracture, meaning patients who have had a fracture related to osteoporosis, or who have multiple risk factors for fracture     Cannot use another osteoporosis medicine or other osteoporosis medicines did not work well   The timeline for early/late injections would be 4 weeks early and any time after the 6 month pedro pablo. If a patient receives their injection late, then the subsequent injection would be 6 months from the date that they actually received the injection    1.  When was the last injection?  12/21/22  2.  Did they check with their insurance for this calendar year?  Yes - CAM team confirmed today okay to proceed  3.  Is there an order in the chart?  yes  4.  Has the patient had dental work involving the bone in the past month or will have work in the next 6 months?  no  5.  Did you have the patient wait 15 minutes after the injection?  yes    The following steps were completed to comply with the REMS program for Prolia:    Reviewed information in the Medication Guide and Patient Counseling Chart, including the serious risks of Prolia  and the symptoms of each risk.    Advised patient to seek prompt medical attention if they have signs or symptoms of any of the serious risks.  Provided each patient a copy of the Medication Guide and Patient Brochure.     Clinic Administered Medication Documentation      Prolia Documentation    Indication: Prolia  (denosumab) is a prescription medicine used to treat osteoporosis in patients who:     Are at high risk for fracture, meaning patients who have had a fracture related to osteoporosis, or who have multiple risk factors for fracture.    Cannot use another osteoporosis medicine or other osteoporosis medicines did not work well.    The timeline for early/late injections would be 4 weeks early and any time after the 6 month pedro pablo. If a patient  receives their injection late, then the subsequent injection would be 6 months from the date that they actually received the injection.    When was the last injection?  22  Was the last injection at least 6 months ago? Yes  Has the prior authorization been completed?  Yes  Is there an active order (written within the past 365 days, with administrations remaining, not ) in the chart?  Yes  Patient denies any dental work involving the bone (e.g. tooth extraction or dental implants) in the past 4 weeks?  Yes  Patient denies plans for any dental work involving the bone (e.g. tooth extraction or dental implants) in the next 4 weeks? Yes    The following steps were completed to comply with the REMS program for Prolia:    Reviewed information in the Medication Guide and Patient Counseling Chart, including the serious risks of Prolia  and the symptoms of each risk.    Advised patient to seek prompt medical attention if they have signs or symptoms of any of the serious risks.    Provided each patient a copy of the Medication Guide and Patient Brochure.      Prior to injection, verified patient identity using patient's name and date of birth. Medication was administered. Please see MAR and medication order for additional information. Patient instructed to remain in clinic for 15 minutes and report any adverse reaction to staff immediately.    Vial/Syringe: Syringe  Was this medication supplied by the patient? No  Verified that the patient has refills remaining in their prescription.         self-care/home management/community/leisure

## 2024-07-17 NOTE — PHYSICAL THERAPY INITIAL EVALUATION ADULT - ADDITIONAL COMMENTS
Wife Carolyn states prior to this admission and from the fall in [place], the patiens  in independent in ADLs and miranda not use any assistive device.

## 2024-07-17 NOTE — PHYSICAL THERAPY INITIAL EVALUATION ADULT - LIVES WITH, PROFILE
AS per wife they live together in a colonial type of house , has 4 steps with 1 rail, 1 flight to the bedroom on the second zeeshan

## 2024-07-17 NOTE — PHYSICAL THERAPY INITIAL EVALUATION ADULT - IMPAIRMENTS FOUND, PT EVAL
aerobic capacity/endurance/arousal, attention, and cognition/ergonomics and body mechanics/gait, locomotion, and balance/poor safety awareness

## 2024-07-17 NOTE — PROGRESS NOTE ADULT - SUBJECTIVE AND OBJECTIVE BOX
Patient is a 73y old  Male who presents with a chief complaint of Physical deconditioning secondary to recent L. hip fracture (16 Jul 2024 22:12)      INTERVAL HPI/OVERNIGHT EVENTS:  Pt was seen and examined, no acute events.      MEDICATIONS  (STANDING):  aspirin enteric coated 81 milliGRAM(s) Oral two times a day  atorvastatin 40 milliGRAM(s) Oral at bedtime  clonazePAM  Tablet 0.5 milliGRAM(s) Oral two times a day  dextrose 5%. 1000 milliLiter(s) (50 mL/Hr) IV Continuous <Continuous>  dextrose 5%. 1000 milliLiter(s) (100 mL/Hr) IV Continuous <Continuous>  dextrose 50% Injectable 25 Gram(s) IV Push once  dextrose 50% Injectable 25 Gram(s) IV Push once  dextrose 50% Injectable 12.5 Gram(s) IV Push once  donepezil 10 milliGRAM(s) Oral daily  finasteride 5 milliGRAM(s) Oral daily  glucagon  Injectable 1 milliGRAM(s) IntraMuscular once  hydrochlorothiazide 12.5 milliGRAM(s) Oral daily  insulin lispro (ADMELOG) corrective regimen sliding scale   SubCutaneous three times a day before meals  losartan 100 milliGRAM(s) Oral daily  PARoxetine 20 milliGRAM(s) Oral two times a day  QUEtiapine 50 milliGRAM(s) Oral at bedtime  tamsulosin 0.8 milliGRAM(s) Oral at bedtime    MEDICATIONS  (PRN):  acetaminophen     Tablet .. 650 milliGRAM(s) Oral every 6 hours PRN Temp greater or equal to 38C (100.4F), Mild Pain (1 - 3)  aluminum hydroxide/magnesium hydroxide/simethicone Suspension 30 milliLiter(s) Oral every 4 hours PRN Dyspepsia  dextrose Oral Gel 15 Gram(s) Oral once PRN Blood Glucose LESS THAN 70 milliGRAM(s)/deciliter  melatonin 3 milliGRAM(s) Oral at bedtime PRN Insomnia  ondansetron Injectable 4 milliGRAM(s) IV Push every 8 hours PRN Nausea and/or Vomiting      Allergies  No Known Allergies        Vital Signs Last 24 Hrs  T(C): 36.7 (17 Jul 2024 12:10), Max: 36.8 (16 Jul 2024 17:22)  T(F): 98.1 (17 Jul 2024 12:10), Max: 98.3 (16 Jul 2024 20:43)  HR: 78 (17 Jul 2024 12:10) (76 - 98)  BP: 128/72 (17 Jul 2024 12:10) (102/68 - 128/72)  BP(mean): --  RR: 18 (17 Jul 2024 12:10) (16 - 18)  SpO2: 98% (17 Jul 2024 12:10) (97% - 100%)    Parameters below as of 17 Jul 2024 12:10  Patient On (Oxygen Delivery Method): room air        PHYSICAL EXAM:  GENERAL: NAD  HEAD:  Atraumatic, Normocephalic  EYES: EOMI, PERRLA, conjunctiva and sclera clear  ENMT: No tonsillar erythema, exudates, or enlargement; Moist mucous membranes, Good dentition, No lesions  NECK: Supple, No JVD, Normal thyroid  NERVOUS SYSTEM:  Alert & Oriented X3, Good concentration; Motor Strength 5/5 B/L upper and lower extremities; DTRs 2+ intact and symmetric  CHEST/LUNG: Clear to percussion bilaterally; No rales, rhonchi, wheezing, or rubs  HEART: Regular rate and rhythm; No murmurs, rubs, or gallops  ABDOMEN: Soft, Nontender, Nondistended; Bowel sounds present  EXTREMITIES:  L. hip incision appears C/D/I, no surrounding erythema or ecchymosis  LYMPH: No lymphadenopathy noted  SKIN: No rashes or lesions          LABS:                        11.1   12.92 )-----------( 384      ( 17 Jul 2024 05:50 )             34.5     07-16    138  |  102  |  26<H>  ----------------------------<  103<H>  3.9   |  31  |  0.85    Ca    11.4<H>      16 Jul 2024 16:10    TPro  6.3  /  Alb  2.8<L>  /  TBili  0.7  /  DBili  x   /  AST  15  /  ALT  32  /  AlkPhos  153<H>  07-16      Urinalysis Basic - ( 16 Jul 2024 16:10 )    Color: x / Appearance: x / SG: x / pH: x  Gluc: 103 mg/dL / Ketone: x  / Bili: x / Urobili: x   Blood: x / Protein: x / Nitrite: x   Leuk Esterase: x / RBC: x / WBC x   Sq Epi: x / Non Sq Epi: x / Bacteria: x      CAPILLARY BLOOD GLUCOSE      POCT Blood Glucose.: 261 mg/dL (17 Jul 2024 11:29)  POCT Blood Glucose.: 187 mg/dL (17 Jul 2024 07:56)  POCT Blood Glucose.: 161 mg/dL (16 Jul 2024 21:30)      RADIOLOGY & ADDITIONAL TESTS:    Imaging Personally Reviewed:  [ ] YES  [ ] NO    Consultant(s) Notes Reviewed:  [ ] YES  [ ] NO    Care Discussed with Consultants/Other Providers [ ] YES  [ ] NO

## 2024-07-18 LAB
24R-OH-CALCIDIOL SERPL-MCNC: 25.4 NG/ML — LOW (ref 30–80)
ALBUMIN SERPL ELPH-MCNC: 2.8 G/DL — LOW (ref 3.3–5)
ALP SERPL-CCNC: 168 U/L — HIGH (ref 40–120)
ALT FLD-CCNC: 27 U/L — SIGNIFICANT CHANGE UP (ref 12–78)
ANION GAP SERPL CALC-SCNC: 4 MMOL/L — LOW (ref 5–17)
AST SERPL-CCNC: 9 U/L — LOW (ref 15–37)
BILIRUB SERPL-MCNC: 0.7 MG/DL — SIGNIFICANT CHANGE UP (ref 0.2–1.2)
BUN SERPL-MCNC: 19 MG/DL — SIGNIFICANT CHANGE UP (ref 7–23)
CALCIUM SERPL-MCNC: 10.5 MG/DL — HIGH (ref 8.5–10.1)
CALCIUM SERPL-MCNC: 10.7 MG/DL — HIGH (ref 8.4–10.5)
CHLORIDE SERPL-SCNC: 105 MMOL/L — SIGNIFICANT CHANGE UP (ref 96–108)
CO2 SERPL-SCNC: 30 MMOL/L — SIGNIFICANT CHANGE UP (ref 22–31)
CREAT SERPL-MCNC: 0.65 MG/DL — SIGNIFICANT CHANGE UP (ref 0.5–1.3)
EGFR: 99 ML/MIN/1.73M2 — SIGNIFICANT CHANGE UP
GLUCOSE BLDC GLUCOMTR-MCNC: 240 MG/DL — HIGH (ref 70–99)
GLUCOSE BLDC GLUCOMTR-MCNC: 306 MG/DL — HIGH (ref 70–99)
GLUCOSE BLDC GLUCOMTR-MCNC: 329 MG/DL — HIGH (ref 70–99)
GLUCOSE SERPL-MCNC: 243 MG/DL — HIGH (ref 70–99)
HCT VFR BLD CALC: 33.3 % — LOW (ref 39–50)
HGB BLD-MCNC: 10.6 G/DL — LOW (ref 13–17)
MCHC RBC-ENTMCNC: 28.5 PG — SIGNIFICANT CHANGE UP (ref 27–34)
MCHC RBC-ENTMCNC: 31.8 G/DL — LOW (ref 32–36)
MCV RBC AUTO: 89.5 FL — SIGNIFICANT CHANGE UP (ref 80–100)
NRBC # BLD: 0 /100 WBCS — SIGNIFICANT CHANGE UP (ref 0–0)
PHOSPHATE SERPL-MCNC: 2.3 MG/DL — LOW (ref 2.5–4.5)
PLATELET # BLD AUTO: 301 K/UL — SIGNIFICANT CHANGE UP (ref 150–400)
POTASSIUM SERPL-MCNC: 3.9 MMOL/L — SIGNIFICANT CHANGE UP (ref 3.5–5.3)
POTASSIUM SERPL-SCNC: 3.9 MMOL/L — SIGNIFICANT CHANGE UP (ref 3.5–5.3)
PROT SERPL-MCNC: 6.4 GM/DL — SIGNIFICANT CHANGE UP (ref 6–8.3)
PTH-INTACT FLD-MCNC: 52 PG/ML — SIGNIFICANT CHANGE UP (ref 15–65)
RBC # BLD: 3.72 M/UL — LOW (ref 4.2–5.8)
RBC # FLD: 14.6 % — HIGH (ref 10.3–14.5)
SODIUM SERPL-SCNC: 139 MMOL/L — SIGNIFICANT CHANGE UP (ref 135–145)
WBC # BLD: 6.57 K/UL — SIGNIFICANT CHANGE UP (ref 3.8–10.5)
WBC # FLD AUTO: 6.57 K/UL — SIGNIFICANT CHANGE UP (ref 3.8–10.5)

## 2024-07-18 PROCEDURE — 99232 SBSQ HOSP IP/OBS MODERATE 35: CPT

## 2024-07-18 RX ADMIN — INSULIN LISPRO 2: 100 INJECTION, SOLUTION SUBCUTANEOUS at 08:17

## 2024-07-18 RX ADMIN — TAMSULOSIN HYDROCHLORIDE 0.8 MILLIGRAM(S): 0.4 CAPSULE ORAL at 22:00

## 2024-07-18 RX ADMIN — PAROXETINE HYDROCHLORIDE 20 MILLIGRAM(S): 37.5 TABLET, FILM COATED, EXTENDED RELEASE ORAL at 18:11

## 2024-07-18 RX ADMIN — INSULIN LISPRO 4: 100 INJECTION, SOLUTION SUBCUTANEOUS at 11:25

## 2024-07-18 RX ADMIN — ASPIRIN 81 MILLIGRAM(S): 325 TABLET, FILM COATED ORAL at 05:39

## 2024-07-18 RX ADMIN — CLONAZEPAM 0.5 MILLIGRAM(S): 2 TABLET ORAL at 18:12

## 2024-07-18 RX ADMIN — Medication 5 MILLIGRAM(S): at 12:13

## 2024-07-18 RX ADMIN — ASPIRIN 81 MILLIGRAM(S): 325 TABLET, FILM COATED ORAL at 18:12

## 2024-07-18 RX ADMIN — DONEPEZIL HYDROCHLORIDE 10 MILLIGRAM(S): 10 TABLET, FILM COATED ORAL at 12:13

## 2024-07-18 RX ADMIN — Medication 50 MILLIGRAM(S): at 21:59

## 2024-07-18 RX ADMIN — INSULIN LISPRO 4: 100 INJECTION, SOLUTION SUBCUTANEOUS at 16:51

## 2024-07-18 RX ADMIN — PAROXETINE HYDROCHLORIDE 20 MILLIGRAM(S): 37.5 TABLET, FILM COATED, EXTENDED RELEASE ORAL at 05:39

## 2024-07-18 RX ADMIN — ATORVASTATIN CALCIUM 40 MILLIGRAM(S): 20 TABLET, FILM COATED ORAL at 21:59

## 2024-07-18 RX ADMIN — LOSARTAN POTASSIUM 100 MILLIGRAM(S): 100 TABLET, FILM COATED ORAL at 05:38

## 2024-07-18 RX ADMIN — CLONAZEPAM 0.5 MILLIGRAM(S): 2 TABLET ORAL at 05:42

## 2024-07-18 NOTE — PROGRESS NOTE ADULT - ASSESSMENT
Panchito Win is a 73 year old male with PMHx of HTN, HLD, NIDDM2, BPH, anxiety, depression, and dementia and recent PSHx L. hemiarthroplasty (on 7/3/24) who presented to the ED on 7/16/24 for complaints of unsteady gait and admitted for physical deconditioning secondary to recent L. hip fracture.    Physical deconditioning secondary to recent L. hip fracture  Sustained mechanical fall while walking out of the shower on 7/1/24, found to have L. hip fracture  Underwent L. hip hemiarthroplasty on 7/3/24, participated with PT inpatient, still with unsteady gait   Baseline functional status is ambulates unassisted  L. hip and L. femur x-ray with L. hip replacement and moderate L. knee degeneration  DVT prophylaxis with ASA 81 mg BID started  PT/OT WBAT consulted: [facility]  Orthopedic surgery recommendations appreciated    Leukocytosis, likely reactive  Afebrile, WBC 12.28K on admission  No signs of infection  Monitor WBC trend    Normocytic anemia  Hgb 9.5 on admission, unknown baseline hgb  No signs of active bleeding  F/u anemia panel, monitor hgb      Chronic medical conditions:  HTN: PTA losartan / HCTZ 100 / 12.5 mg  HLD: PTA atorvastatin 40 mg  NIDDM2: POC qac and qhs, PTA glipizide ER 10 mg, Janumet XR 50 / 1000 mg BID, and jardiance 10 mg held, low dose SSI qac started, blood glucose goal < 180, f/u A1c  BPH: PTA alfuzosin 10 mg reordered as tamsulosin 0.8 mg given alfuzosin not on formulary, finasteride 5 mg  Anxiety: PTA paroxetine 20 mg BID, clonazepam 0.5 mg BID  Depression: PTA quetiapine 50 mg qhs  Dementia: PTA donepezil 10 mg

## 2024-07-19 ENCOUNTER — TRANSCRIPTION ENCOUNTER (OUTPATIENT)
Age: 74
End: 2024-07-19

## 2024-07-19 VITALS
TEMPERATURE: 98 F | RESPIRATION RATE: 17 BRPM | DIASTOLIC BLOOD PRESSURE: 73 MMHG | OXYGEN SATURATION: 100 % | HEART RATE: 69 BPM | SYSTOLIC BLOOD PRESSURE: 115 MMHG

## 2024-07-19 LAB
GLUCOSE BLDC GLUCOMTR-MCNC: 282 MG/DL — HIGH (ref 70–99)
GLUCOSE BLDC GLUCOMTR-MCNC: 358 MG/DL — HIGH (ref 70–99)

## 2024-07-19 PROCEDURE — 99239 HOSP IP/OBS DSCHRG MGMT >30: CPT

## 2024-07-19 RX ORDER — MAGNESIUM, ALUMINUM HYDROXIDE 400-400
30 TABLET,CHEWABLE ORAL
Qty: 0 | Refills: 0 | DISCHARGE
Start: 2024-07-19

## 2024-07-19 RX ORDER — LOSARTAN/HYDROCHLOROTHIAZIDE 100MG-25MG
1 TABLET ORAL
Refills: 0 | DISCHARGE

## 2024-07-19 RX ORDER — ASPIRIN 325 MG/1
1 TABLET, FILM COATED ORAL
Qty: 0 | Refills: 0 | DISCHARGE
Start: 2024-07-19

## 2024-07-19 RX ORDER — LOSARTAN POTASSIUM 100 MG/1
1 TABLET, FILM COATED ORAL
Qty: 0 | Refills: 0 | DISCHARGE
Start: 2024-07-19

## 2024-07-19 RX ADMIN — LOSARTAN POTASSIUM 100 MILLIGRAM(S): 100 TABLET, FILM COATED ORAL at 05:15

## 2024-07-19 RX ADMIN — INSULIN LISPRO 5: 100 INJECTION, SOLUTION SUBCUTANEOUS at 11:33

## 2024-07-19 RX ADMIN — ASPIRIN 81 MILLIGRAM(S): 325 TABLET, FILM COATED ORAL at 05:15

## 2024-07-19 RX ADMIN — CLONAZEPAM 0.5 MILLIGRAM(S): 2 TABLET ORAL at 05:16

## 2024-07-19 RX ADMIN — PAROXETINE HYDROCHLORIDE 20 MILLIGRAM(S): 37.5 TABLET, FILM COATED, EXTENDED RELEASE ORAL at 05:15

## 2024-07-19 RX ADMIN — INSULIN LISPRO 3: 100 INJECTION, SOLUTION SUBCUTANEOUS at 08:18

## 2024-07-19 RX ADMIN — Medication 5 MILLIGRAM(S): at 12:31

## 2024-07-19 RX ADMIN — DONEPEZIL HYDROCHLORIDE 10 MILLIGRAM(S): 10 TABLET, FILM COATED ORAL at 12:32

## 2024-07-19 NOTE — DISCHARGE NOTE PROVIDER - NSDCCPCAREPLAN_GEN_ALL_CORE_FT
PRINCIPAL DISCHARGE DIAGNOSIS  Diagnosis: Left hip pain  Assessment and Plan of Treatment: Panchito Win is a 73 year old male with PMHx of HTN, HLD, NIDDM2, BPH, anxiety, depression, and dementia and recent PSHx L. hemiarthroplasty (on 7/3/24) who presented to the ED on 7/16/24 for complaints of unsteady gait and admitted for physical deconditioning secondary to recent L. hip fracture.  Physical deconditioning secondary to recent L. hip fracture  Sustained mechanical fall while walking out of the shower on 7/1/24, found to have L. hip fracture  Underwent L. hip hemiarthroplasty on 7/3/24, participated with PT inpatient, still with unsteady gait   Baseline functional status is ambulates unassisted  L. hip and L. femur x-ray with L. hip replacement and moderate L. knee degeneration  DVT prophylaxis with ASA 81 mg BID started  PT/OT WBAT consulted: [facility]  Orthopedic surgery recommendations appreciated  Leukocytosis, likely reactive  Afebrile, WBC 12.28K on admission, WNL now  No signs of infection  Monitor WBC trend  Normocytic anemia  No signs of active bleeding  Hb stable  Borderline % saturation, high ferritin, outpt follow up   Hypercalcemia:  PTH upper limit of normal, mild low Vt D25  Pt is on HCTZ, hold now, cant rule out primary hyperparathyroidism  or FHH  Hold off Vit D supplement until Ca improves  Endo follow up outpt  Chronic medical conditions:  HTN: PTA losartan / HCTZ 100 / 12.5 mg, hold HCTZ for hypercalcemia, reassess.   HLD: PTA atorvastatin 40 mg  NIDDM2: A1c 8.1,  PTA glipizide ER 10 mg, Janumet XR 50 / 1000 mg BID, and jardiance 10 mg  BPH: PTA alfuzosin 10 mg, finasteride 5 mg  Anxiety: PTA paroxetine 20 mg BID, clonazepam 0.5 mg BID  Depression: PTA quetiapine 50 mg qhs  Dementia: PTA donepezil 10 mg

## 2024-07-19 NOTE — DISCHARGE NOTE PROVIDER - NSDCMRMEDTOKEN_GEN_ALL_CORE_FT
alfuzosin 10 mg oral tablet, extended release: 1 tab(s) orally once a day  atorvastatin 40 mg oral tablet: 1 tab(s) orally once a day  clonazePAM 0.5 mg oral tablet: 1 tab(s) orally 2 times a day  donepezil 10 mg oral tablet: 1 tab(s) orally once a day  finasteride 5 mg oral tablet: 1 tab(s) orally once a day  glipiZIDE 10 mg oral tablet, extended release: 1 tab(s) orally once a day  Janumet XR 50 mg-1000 mg oral tablet, extended release: 2 tab(s) orally 2 times a day  Jardiance 10 mg oral tablet: 1 tab(s) orally once a day  losartan-hydroCHLOROthiazide 100 mg-12.5 mg oral tablet: 1 tab(s) orally once a day  PARoxetine 10 mg oral tablet: 2 tab(s) orally 2 times a day  QUEtiapine 25 mg oral tablet: 2 tab(s) orally once a day (at bedtime)   alfuzosin 10 mg oral tablet, extended release: 1 tab(s) orally once a day  aluminum hydroxide-magnesium hydroxide 200 mg-200 mg/5 mL oral suspension: 30 milliliter(s) orally every 4 hours As needed Dyspepsia  aspirin 81 mg oral delayed release tablet: 1 tab(s) orally 2 times a day  atorvastatin 40 mg oral tablet: 1 tab(s) orally once a day  clonazePAM 0.5 mg oral tablet: 1 tab(s) orally 2 times a day  donepezil 10 mg oral tablet: 1 tab(s) orally once a day  finasteride 5 mg oral tablet: 1 tab(s) orally once a day  glipiZIDE 10 mg oral tablet, extended release: 1 tab(s) orally once a day  Janumet XR 50 mg-1000 mg oral tablet, extended release: 2 tab(s) orally 2 times a day  Jardiance 10 mg oral tablet: 1 tab(s) orally once a day  losartan 100 mg oral tablet: 1 tab(s) orally once a day  PARoxetine 10 mg oral tablet: 2 tab(s) orally 2 times a day  QUEtiapine 25 mg oral tablet: 2 tab(s) orally once a day (at bedtime)

## 2024-07-19 NOTE — DISCHARGE NOTE PROVIDER - HOSPITAL COURSE
Panchito Win is a 73 year old male with PMHx of HTN, HLD, NIDDM2, BPH, anxiety, depression, and dementia and recent PSHx L. hemiarthroplasty (on 7/3/24) who presented to the ED on 7/16/24 for complaints of unsteady gait and admitted for physical deconditioning secondary to recent L. hip fracture.    Physical deconditioning secondary to recent L. hip fracture  Sustained mechanical fall while walking out of the shower on 7/1/24, found to have L. hip fracture  Underwent L. hip hemiarthroplasty on 7/3/24, participated with PT inpatient, still with unsteady gait   Baseline functional status is ambulates unassisted  L. hip and L. femur x-ray with L. hip replacement and moderate L. knee degeneration  DVT prophylaxis with ASA 81 mg BID started  PT/OT WBAT consulted: [facility]  Orthopedic surgery recommendations appreciated    Leukocytosis, likely reactive  Afebrile, WBC 12.28K on admission, WNL now  No signs of infection  Monitor WBC trend    Normocytic anemia  No signs of active bleeding  Hb stable  Borderline % saturation, high ferritin, outpt follow up     Hypercalcemia:  - PTH upper limit of normal, mild low Vt D25  Pt is on HCTZ, hold now, cant rule out primary hyperparathyroidism  or FHH    Chronic medical conditions:  HTN: PTA losartan / HCTZ 100 / 12.5 mg, hold HCTZ for hypercalcemia, reassess.   HLD: PTA atorvastatin 40 mg  NIDDM2: A1c 8.1,  PTA glipizide ER 10 mg, Janumet XR 50 / 1000 mg BID, and jardiance 10 mg  BPH: PTA alfuzosin 10 mg, finasteride 5 mg  Anxiety: PTA paroxetine 20 mg BID, clonazepam 0.5 mg BID  Depression: PTA quetiapine 50 mg qhs  Dementia: PTA donepezil 10 mg       Panchito Win is a 73 year old male with PMHx of HTN, HLD, NIDDM2, BPH, anxiety, depression, and dementia and recent PSHx L. hemiarthroplasty (on 7/3/24) who presented to the ED on 7/16/24 for complaints of unsteady gait and admitted for physical deconditioning secondary to recent L. hip fracture.    Physical deconditioning secondary to recent L. hip fracture  Sustained mechanical fall while walking out of the shower on 7/1/24, found to have L. hip fracture  Underwent L. hip hemiarthroplasty on 7/3/24, participated with PT inpatient, still with unsteady gait   Baseline functional status is ambulates unassisted  L. hip and L. femur x-ray with L. hip replacement and moderate L. knee degeneration  DVT prophylaxis with ASA 81 mg BID started per ortho  PT/OT WBAT consulted: [facility]  Orthopedic surgery recommendations appreciated    Leukocytosis, likely reactive  Afebrile, WBC 12.28K on admission, WNL now  No signs of infection  Monitor WBC trend    Normocytic anemia  No signs of active bleeding  Hb stable  Borderline % saturation, high ferritin, outpt follow up     Hypercalcemia:  PTH upper limit of normal, mild low Vt D25  Pt is on HCTZ, hold now, cant rule out primary hyperparathyroidism  or FHH  Hold off Vit D supplement until Ca improves  Endo follow up outpt    Chronic medical conditions:  HTN: PTA losartan / HCTZ 100 / 12.5 mg, hold HCTZ for hypercalcemia, reassess.   HLD: PTA atorvastatin 40 mg  NIDDM2: A1c 8.1,  PTA glipizide ER 10 mg, Janumet XR 50 / 1000 mg BID, and jardiance 10 mg  BPH: PTA alfuzosin 10 mg, finasteride 5 mg  Anxiety: PTA paroxetine 20 mg BID, clonazepam 0.5 mg BID  Depression: PTA quetiapine 50 mg qhs  Dementia: PTA donepezil 10 mg

## 2024-07-19 NOTE — DISCHARGE NOTE PROVIDER - ATTENDING DISCHARGE PHYSICAL EXAMINATION:
Vital Signs Last 24 Hrs  T(C): 36.6 (19 Jul 2024 11:19), Max: 36.7 (18 Jul 2024 11:39)  T(F): 97.8 (19 Jul 2024 11:19), Max: 98 (18 Jul 2024 11:39)  HR: 69 (19 Jul 2024 11:19) (63 - 90)  BP: 115/73 (19 Jul 2024 11:19) (101/65 - 137/79)  BP(mean): --  RR: 17 (19 Jul 2024 11:19) (17 - 18)  SpO2: 100% (19 Jul 2024 11:19) (97% - 100%)    Parameters below as of 19 Jul 2024 11:19  Patient On (Oxygen Delivery Method): room air    GENERAL: NAD  HEAD:  Atraumatic, Normocephalic  EYES: EOMI, PERRLA, conjunctiva and sclera clear  ENMT: No tonsillar erythema, exudates, or enlargement; Moist mucous membranes, Good dentition, No lesions  NECK: Supple, No JVD, Normal thyroid  NERVOUS SYSTEM:  Alert & Oriented X3, Good concentration; Motor Strength 5/5 B/L upper and lower extremities; DTRs 2+ intact and symmetric  CHEST/LUNG: Clear to percussion bilaterally; No rales, rhonchi, wheezing, or rubs  HEART: Regular rate and rhythm; No murmurs, rubs, or gallops  ABDOMEN: Soft, Nontender, Nondistended; Bowel sounds present  EXTREMITIES:  L. hip incision appears C/D/I, no surrounding erythema or ecchymosis  LYMPH: No lymphadenopathy noted  SKIN: No rashes or lesions

## 2024-07-19 NOTE — DISCHARGE NOTE NURSING/CASE MANAGEMENT/SOCIAL WORK - PATIENT PORTAL LINK FT
You can access the FollowMyHealth Patient Portal offered by [facility] by registering at the following website: [email or web address]. By joining [employer]’s FollowMyHealth portal, you will also be able to view your health information using other applications (apps) compatible with our system.

## 2024-07-19 NOTE — DISCHARGE NOTE PROVIDER - PROVIDER TOKENS
PROVIDER:[TOKEN:[[id number]]],FREE:[LAST:[pcp, primary endocrinologist],PHONE:[(   )    -],FAX:[(   )    -]]

## 2024-07-20 LAB
ALBUMIN SERPL ELPH-MCNC: 2.8 G/DL — LOW (ref 3.3–5)
ALP SERPL-CCNC: 166 U/L — HIGH (ref 40–120)
ALT FLD-CCNC: 22 U/L — SIGNIFICANT CHANGE UP (ref 12–78)
ANION GAP SERPL CALC-SCNC: 3 MMOL/L — LOW (ref 5–17)
AST SERPL-CCNC: 9 U/L — LOW (ref 15–37)
BILIRUB SERPL-MCNC: 0.5 MG/DL — SIGNIFICANT CHANGE UP (ref 0.2–1.2)
BUN SERPL-MCNC: 20 MG/DL — SIGNIFICANT CHANGE UP (ref 7–23)
CALCIUM SERPL-MCNC: 10.4 MG/DL — HIGH (ref 8.5–10.1)
CHLORIDE SERPL-SCNC: 105 MMOL/L — SIGNIFICANT CHANGE UP (ref 96–108)
CO2 SERPL-SCNC: 30 MMOL/L — SIGNIFICANT CHANGE UP (ref 22–31)
CREAT SERPL-MCNC: 0.65 MG/DL — SIGNIFICANT CHANGE UP (ref 0.5–1.3)
EGFR: 99 ML/MIN/1.73M2 — SIGNIFICANT CHANGE UP
GLUCOSE SERPL-MCNC: 283 MG/DL — HIGH (ref 70–99)
HCT VFR BLD CALC: 32.4 % — LOW (ref 39–50)
HGB BLD-MCNC: 10.4 G/DL — LOW (ref 13–17)
MCHC RBC-ENTMCNC: 28.6 PG — SIGNIFICANT CHANGE UP (ref 27–34)
MCHC RBC-ENTMCNC: 32.1 G/DL — SIGNIFICANT CHANGE UP (ref 32–36)
MCV RBC AUTO: 89 FL — SIGNIFICANT CHANGE UP (ref 80–100)
NRBC # BLD: 0 /100 WBCS — SIGNIFICANT CHANGE UP (ref 0–0)
PLATELET # BLD AUTO: 294 K/UL — SIGNIFICANT CHANGE UP (ref 150–400)
POTASSIUM SERPL-MCNC: 3.9 MMOL/L — SIGNIFICANT CHANGE UP (ref 3.5–5.3)
POTASSIUM SERPL-SCNC: 3.9 MMOL/L — SIGNIFICANT CHANGE UP (ref 3.5–5.3)
PROT SERPL-MCNC: 6.2 GM/DL — SIGNIFICANT CHANGE UP (ref 6–8.3)
RBC # BLD: 3.64 M/UL — LOW (ref 4.2–5.8)
RBC # FLD: 14.5 % — SIGNIFICANT CHANGE UP (ref 10.3–14.5)
SODIUM SERPL-SCNC: 138 MMOL/L — SIGNIFICANT CHANGE UP (ref 135–145)
WBC # BLD: 7.02 K/UL — SIGNIFICANT CHANGE UP (ref 3.8–10.5)
WBC # FLD AUTO: 7.02 K/UL — SIGNIFICANT CHANGE UP (ref 3.8–10.5)

## 2024-08-20 ENCOUNTER — APPOINTMENT (OUTPATIENT)
Dept: ORTHOPEDIC SURGERY | Facility: CLINIC | Age: 74
End: 2024-08-20
Payer: COMMERCIAL

## 2024-08-20 DIAGNOSIS — Z96.642 PRESENCE OF LEFT ARTIFICIAL HIP JOINT: ICD-10-CM

## 2024-08-20 DIAGNOSIS — S72.009A FRACTURE OF UNSPECIFIED PART OF NECK OF UNSPECIFIED FEMUR, INITIAL ENCOUNTER FOR CLOSED FRACTURE: ICD-10-CM

## 2024-08-20 PROCEDURE — 99203 OFFICE O/P NEW LOW 30 MIN: CPT

## 2024-08-20 PROCEDURE — 99213 OFFICE O/P EST LOW 20 MIN: CPT

## 2024-08-20 NOTE — IMAGING

## 2024-08-20 NOTE — HISTORY OF PRESENT ILLNESS
[de-identified] : 07/16/2024 Mr. LEYLA MARKS is a 73 year male that comes in today with a chief complaint of Lt  hip pain. Pt went on trip to [place] with his wife on 6/30, the day they got there he slipped getting out of the shower and fractured the head of his femur.  Pt had a KENDRICK on 7/3. Pt stayed in the hospital for 15 days; Pt did PT while in [place], doing better. Pt also had a catheter put in.   08/20/24: pt states he is doing okay, was released from hospital on 8/14/24. would like to have parking permit papers filled out. cane used for ambulation

## 2024-08-20 NOTE — ASSESSMENT
[FreeTextEntry1] : 73 year M with left hip replacement in [place] on July 3, 2024. lateral approach KENDRICK ASA 81mg BID recommended patient is unsafe at home and thus recommend admission to hospital for dispo/rehab placement foely removed today 1 month fu  08/20/2024  doing well 6-7 weeks out from surgery 6 week follow up discontinued ASA  dental abx recommended  hip precautions lifted

## 2024-08-23 ENCOUNTER — OFFICE (OUTPATIENT)
Facility: LOCATION | Age: 74
Setting detail: OPHTHALMOLOGY
End: 2024-08-23
Payer: MEDICARE

## 2024-08-23 VITALS — WEIGHT: 160 LBS | HEIGHT: 72 IN | BODY MASS INDEX: 21.67 KG/M2

## 2024-08-23 DIAGNOSIS — H43.813: ICD-10-CM

## 2024-08-23 DIAGNOSIS — E11.9: ICD-10-CM

## 2024-08-23 DIAGNOSIS — H25.13: ICD-10-CM

## 2024-08-23 DIAGNOSIS — H35.373: ICD-10-CM

## 2024-08-23 PROBLEM — H25.12 CATARACT SENILE NUCLEAR SCLEROSIS; RIGHT EYE, LEFT EYE, BOTH EYES: Status: ACTIVE | Noted: 2024-08-23

## 2024-08-23 PROBLEM — H25.11 CATARACT SENILE NUCLEAR SCLEROSIS; RIGHT EYE, LEFT EYE, BOTH EYES: Status: ACTIVE | Noted: 2024-08-23

## 2024-08-23 PROCEDURE — 92004 COMPRE OPH EXAM NEW PT 1/>: CPT | Performed by: OPHTHALMOLOGY

## 2024-08-23 PROCEDURE — 92250 FUNDUS PHOTOGRAPHY W/I&R: CPT | Performed by: OPHTHALMOLOGY

## 2024-08-23 ASSESSMENT — CONFRONTATIONAL VISUAL FIELD TEST (CVF)
OS_FINDINGS: FULL
OD_FINDINGS: FULL

## 2024-09-17 ENCOUNTER — ASC (OUTPATIENT)
Dept: URBAN - METROPOLITAN AREA SURGERY 8 | Facility: SURGERY | Age: 74
Setting detail: OPHTHALMOLOGY
End: 2024-09-17
Payer: MEDICARE

## 2024-09-17 DIAGNOSIS — H25.11: ICD-10-CM

## 2024-09-17 DIAGNOSIS — H52.211: ICD-10-CM

## 2024-09-17 PROCEDURE — 66984 XCAPSL CTRC RMVL W/O ECP: CPT | Mod: RT | Performed by: OPHTHALMOLOGY

## 2024-09-17 PROCEDURE — A9270 NON-COVERED ITEM OR SERVICE: HCPCS | Mod: GY | Performed by: OPHTHALMOLOGY

## 2024-09-17 PROCEDURE — FEMTO FEMTOSECOND LASER: Mod: GY | Performed by: OPHTHALMOLOGY

## 2024-09-17 PROCEDURE — 68841 INSJ RX ELUT IMPLT LAC CANAL: CPT | Mod: RT | Performed by: OPHTHALMOLOGY

## 2024-09-18 ENCOUNTER — OFFICE (OUTPATIENT)
Facility: LOCATION | Age: 74
Setting detail: OPHTHALMOLOGY
End: 2024-09-18
Payer: MEDICARE

## 2024-09-18 DIAGNOSIS — H25.13: ICD-10-CM

## 2024-09-18 DIAGNOSIS — H25.12: ICD-10-CM

## 2024-09-18 PROCEDURE — 92136 OPHTHALMIC BIOMETRY: CPT | Mod: TC | Performed by: OPHTHALMOLOGY

## 2024-09-18 PROCEDURE — 99214 OFFICE O/P EST MOD 30 MIN: CPT | Mod: 24 | Performed by: OPHTHALMOLOGY

## 2024-09-18 PROCEDURE — 92136 OPHTHALMIC BIOMETRY: CPT | Mod: 26,LT | Performed by: OPHTHALMOLOGY

## 2024-09-18 ASSESSMENT — CONFRONTATIONAL VISUAL FIELD TEST (CVF)
OD_FINDINGS: FULL
OS_FINDINGS: FULL

## 2024-10-01 ENCOUNTER — ASC (OUTPATIENT)
Dept: URBAN - METROPOLITAN AREA SURGERY 8 | Facility: SURGERY | Age: 74
Setting detail: OPHTHALMOLOGY
End: 2024-10-01
Payer: MEDICARE

## 2024-10-01 ENCOUNTER — APPOINTMENT (OUTPATIENT)
Dept: ORTHOPEDIC SURGERY | Facility: CLINIC | Age: 74
End: 2024-10-01

## 2024-10-01 DIAGNOSIS — H25.12: ICD-10-CM

## 2024-10-01 DIAGNOSIS — H52.212: ICD-10-CM

## 2024-10-01 PROCEDURE — 68841 INSJ RX ELUT IMPLT LAC CANAL: CPT | Mod: 79,LT | Performed by: OPHTHALMOLOGY

## 2024-10-01 PROCEDURE — 66984 XCAPSL CTRC RMVL W/O ECP: CPT | Mod: 79,LT | Performed by: OPHTHALMOLOGY

## 2024-10-01 PROCEDURE — FEMTO FEMTOSECOND LASER: Mod: GY | Performed by: OPHTHALMOLOGY

## 2024-10-01 PROCEDURE — A9270 NON-COVERED ITEM OR SERVICE: HCPCS | Mod: GY | Performed by: OPHTHALMOLOGY

## 2024-10-02 ENCOUNTER — RX ONLY (RX ONLY)
Age: 74
End: 2024-10-02

## 2024-10-02 ENCOUNTER — OFFICE (OUTPATIENT)
Facility: LOCATION | Age: 74
Setting detail: OPHTHALMOLOGY
End: 2024-10-02
Payer: MEDICARE

## 2024-10-02 DIAGNOSIS — Z96.1: ICD-10-CM

## 2024-10-02 DIAGNOSIS — H35.373: ICD-10-CM

## 2024-10-02 PROCEDURE — 99024 POSTOP FOLLOW-UP VISIT: CPT | Performed by: OPHTHALMOLOGY

## 2024-10-02 ASSESSMENT — REFRACTION_AUTOREFRACTION
OD_CYLINDER: -0.75
OS_SPHERE: +1.00
OS_CYLINDER: -2.25
OD_AXIS: 121
OS_AXIS: 056
OD_SPHERE: -0.25

## 2024-10-02 ASSESSMENT — CONFRONTATIONAL VISUAL FIELD TEST (CVF)
OD_FINDINGS: FULL
OS_FINDINGS: FULL

## 2024-10-02 ASSESSMENT — KERATOMETRY
OS_K2POWER_DIOPTERS: 45.50
OS_AXISANGLE_DEGREES: 012
OS_K1POWER_DIOPTERS: 45.50
OD_K2POWER_DIOPTERS: 44.00
OD_AXISANGLE_DEGREES: 090
OD_K1POWER_DIOPTERS: 44.00

## 2024-10-02 ASSESSMENT — VISUAL ACUITY
OS_BCVA: 20/30-2
OD_BCVA: 20/40

## 2024-10-09 ENCOUNTER — RX ONLY (RX ONLY)
Age: 74
End: 2024-10-09

## 2024-10-09 ENCOUNTER — OFFICE (OUTPATIENT)
Facility: LOCATION | Age: 74
Setting detail: OPHTHALMOLOGY
End: 2024-10-09
Payer: MEDICARE

## 2024-10-09 DIAGNOSIS — H35.373: ICD-10-CM

## 2024-10-09 DIAGNOSIS — Z96.1: ICD-10-CM

## 2024-10-09 PROCEDURE — 99024 POSTOP FOLLOW-UP VISIT: CPT | Performed by: OPHTHALMOLOGY

## 2024-10-09 ASSESSMENT — KERATOMETRY
OS_K1POWER_DIOPTERS: 44.50
OD_K1POWER_DIOPTERS: 43.75
OS_AXISANGLE_DEGREES: 095
OD_K2POWER_DIOPTERS: 43.25
OS_K2POWER_DIOPTERS: 42.25
OD_AXISANGLE_DEGREES: 090

## 2024-10-09 ASSESSMENT — REFRACTION_AUTOREFRACTION
OD_CYLINDER: -1.00
OS_SPHERE: +1.50
OS_AXIS: 055
OD_AXIS: 105
OS_CYLINDER: -3.25
OD_SPHERE: +0.50

## 2024-10-09 ASSESSMENT — VISUAL ACUITY
OS_BCVA: 20/40
OD_BCVA: 20/50+1

## 2024-10-09 ASSESSMENT — CONFRONTATIONAL VISUAL FIELD TEST (CVF)
OD_FINDINGS: FULL
OS_FINDINGS: FULL

## 2024-11-04 ENCOUNTER — OFFICE (OUTPATIENT)
Facility: LOCATION | Age: 74
Setting detail: OPHTHALMOLOGY
End: 2024-11-04
Payer: MEDICARE

## 2024-11-04 DIAGNOSIS — Z96.1: ICD-10-CM

## 2024-11-04 PROCEDURE — 99024 POSTOP FOLLOW-UP VISIT: CPT | Performed by: OPHTHALMOLOGY

## 2024-11-04 ASSESSMENT — REFRACTION_TRIALFRAME
OS_SPHERE: +1.00
OD_VA1: 20/30
OS_VA1: 20/25
OD_SPHERE: +0.25
OD_AXIS: 090
OD_ADD: +2.50
OS_ADD: +2.50
OS_AXIS: 050
OD_CYLINDER: -1.00
OS_CYLINDER: +1.50

## 2024-11-04 ASSESSMENT — VISUAL ACUITY
OD_BCVA: 20/40
OS_BCVA: 20/40

## 2024-11-04 ASSESSMENT — REFRACTION_AUTOREFRACTION
OS_SPHERE: +1.50
OD_AXIS: 095
OS_CYLINDER: -2.75
OD_CYLINDER: -1.00
OD_SPHERE: +0.50
OS_AXIS: 055

## 2024-11-04 ASSESSMENT — KERATOMETRY
OD_K2POWER_DIOPTERS: 43.25
OS_K2POWER_DIOPTERS: 42.50
OS_K1POWER_DIOPTERS: 44.75
OS_AXISANGLE_DEGREES: 095
OD_AXISANGLE_DEGREES: 085
OD_K1POWER_DIOPTERS: 44.00

## 2024-11-04 ASSESSMENT — CONFRONTATIONAL VISUAL FIELD TEST (CVF)
OS_FINDINGS: FULL
OD_FINDINGS: FULL